# Patient Record
Sex: MALE | URBAN - METROPOLITAN AREA
[De-identification: names, ages, dates, MRNs, and addresses within clinical notes are randomized per-mention and may not be internally consistent; named-entity substitution may affect disease eponyms.]

---

## 2018-06-15 ENCOUNTER — IMPORTED ENCOUNTER (OUTPATIENT)
Dept: URBAN - METROPOLITAN AREA CLINIC 31 | Facility: CLINIC | Age: 58
End: 2018-06-15

## 2018-06-15 PROBLEM — H43.21: Noted: 2018-06-15

## 2018-06-15 PROCEDURE — 92014 COMPRE OPH EXAM EST PT 1/>: CPT

## 2018-06-15 PROCEDURE — 92015 DETERMINE REFRACTIVE STATE: CPT

## 2018-06-15 NOTE — PATIENT DISCUSSION
1.  Refractive error- pt loves his gls-  Sl;ight change in rx. Needs Rx suns. Has none. 2.  Asteroid Hyalosis OD3.   RTN 1 yr cE  VSP

## 2020-12-23 NOTE — PATIENT DISCUSSION
12/23/2020:  keep DWS eval. for IOLs.   appears to have unconventional monoV unless cataracts have shifted vargas.  pt prefers to see without glasses but upon FULL discussion pt is not understanding the need to pay for CV to get a specific outcome vs getting insurance covered surgery and NOT being free of glasses.   ed may need DVO at night even with successful monoV outcome.

## 2021-01-08 NOTE — PATIENT DISCUSSION
Patient to have Harini with the OS and if she is happy with her vision we will do monovision.  Had lengthy discussion that Patient has astigmatism and it may cause the left eye not to meet best vision uncorrected.

## 2021-01-21 NOTE — PATIENT DISCUSSION
ok to proceed with IOL due to [address] for sx made today with KMS and goal: Basic -2.00 (***THIS NEEDS TBD by [facility] on day of Sx OD***).

## 2021-01-25 NOTE — PATIENT DISCUSSION
Cataract surgery has been performed in the first eye and activities of daily living are still impaired. The patient would like to proceed with cataract surgery in the second eye as scheduled.  Patient states she is happy with current monovision today. The patient elects Basic OD, goal of -2.00 Per DWS.

## 2022-04-02 ASSESSMENT — TONOMETRY
OD_IOP_MMHG: 12
OS_IOP_MMHG: 12

## 2022-06-23 NOTE — PATIENT DISCUSSION
Patient to have Harini with the OS and if she is happy with her vision we will do monovision.  Had lengthy discussion that Patient has astigmatism and it may cause the left eye not to meet best vision uncorrected. Instructions: This plan will send the code FBSE to the PM system.  DO NOT or CHANGE the price. Price (Do Not Change): 0.00 Detail Level: Detailed

## 2023-01-09 ENCOUNTER — NEW PATIENT (OUTPATIENT)
Dept: URBAN - METROPOLITAN AREA CLINIC 34 | Facility: CLINIC | Age: 63
End: 2023-01-09

## 2023-01-09 DIAGNOSIS — Z01.00: ICD-10-CM

## 2023-01-09 PROCEDURE — 92004 COMPRE OPH EXAM NEW PT 1/>: CPT

## 2023-01-09 PROCEDURE — 92015 DETERMINE REFRACTIVE STATE: CPT

## 2023-01-09 ASSESSMENT — TONOMETRY
OD_IOP_MMHG: 13
OS_IOP_MMHG: 13

## 2023-01-09 ASSESSMENT — VISUAL ACUITY
OS_SC: 20/30
OD_SC: 20/30

## 2023-01-09 NOTE — PATIENT DISCUSSION
pt loves his gls he got here but rx needs to change--Got pr elsewhere and hates them.  slight change in rx. Needs Rx suns. Has none.
